# Patient Record
(demographics unavailable — no encounter records)

---

## 2024-11-20 NOTE — DISCUSSION/SUMMARY
[FreeTextEntry1] : Impression: Vulvovaginal atrophy, personal history of breast cancer, osteopenia send  Recommendations: Self breast exam, mammography and breast sonogram annually, calcium and vitamin D supplementation regular exercise, continue follow-up with breast surgery  Follow-up in 6 months

## 2024-11-20 NOTE — HISTORY OF PRESENT ILLNESS
[FreeTextEntry1] : Patient is a 60-year-old female who presents for routine annual gynecologic examination.  Patient with a personal history of breast cancer.  Patient's last mammogram was September 2024 last bone density was November 2024.  Patient has no complaints.  Patient is followed by breast surgeon at [facility]

## 2025-05-14 NOTE — HISTORY OF PRESENT ILLNESS
[FreeTextEntry1] : Patient is a 60-year-old female who presents for 6-month interval surveillance visit.  Patient with a personal history of breast cancer.  Patient has no complaints.  Patient recently started Zepbound for weight loss.  Patient's last mammogram was September 2024 and last breast mammogram was April 2025.  Both were within normal limit

## 2025-05-14 NOTE — DISCUSSION/SUMMARY
[FreeTextEntry1] : Impression: Personal history of breast cancer, fibrocystic breast changes, osteopenia, vaginal atrophy, obesity  Recommendations: Self breast exam, mammography annually, breast MRI annually, regular exercise  Follow-up in 6 months